# Patient Record
Sex: MALE | Race: WHITE | Employment: FULL TIME | URBAN - METROPOLITAN AREA
[De-identification: names, ages, dates, MRNs, and addresses within clinical notes are randomized per-mention and may not be internally consistent; named-entity substitution may affect disease eponyms.]

---

## 2022-06-28 RX ORDER — MONTELUKAST SODIUM 10 MG/1
TABLET ORAL
COMMUNITY
Start: 2022-04-04

## 2022-06-28 RX ORDER — BUDESONIDE 0.5 MG/2ML
INHALANT ORAL
COMMUNITY

## 2022-06-28 RX ORDER — ROSUVASTATIN CALCIUM 10 MG/1
TABLET, COATED ORAL
COMMUNITY
Start: 2021-12-09 | End: 2022-09-13 | Stop reason: SDUPTHER

## 2022-06-28 RX ORDER — AMOXICILLIN AND CLAVULANATE POTASSIUM 875; 125 MG/1; MG/1
TABLET, FILM COATED ORAL
COMMUNITY
End: 2022-09-13 | Stop reason: ALTCHOICE

## 2022-06-28 RX ORDER — AMLODIPINE BESYLATE 2.5 MG/1
TABLET ORAL
COMMUNITY
End: 2022-09-13 | Stop reason: ALTCHOICE

## 2022-09-01 PROBLEM — S51.019A: Status: ACTIVE | Noted: 2022-09-01

## 2022-09-01 PROBLEM — J30.9 ALLERGIC RHINITIS: Status: ACTIVE | Noted: 2022-09-01

## 2022-09-01 PROBLEM — R07.9 CHEST PAIN ON EXERTION: Status: ACTIVE | Noted: 2022-09-01

## 2022-09-01 PROBLEM — E78.2 MIXED HYPERLIPIDEMIA: Status: ACTIVE | Noted: 2022-09-01

## 2024-09-27 PROBLEM — R07.9 CHEST PAIN ON EXERTION: Status: RESOLVED | Noted: 2022-09-01 | Resolved: 2024-09-27

## 2024-09-27 PROBLEM — S51.019A: Status: RESOLVED | Noted: 2022-09-01 | Resolved: 2024-09-27

## 2024-12-17 PROBLEM — F41.9 ANXIETY: Status: ACTIVE | Noted: 2024-12-17

## 2025-01-15 ENCOUNTER — APPOINTMENT (OUTPATIENT)
Dept: CT IMAGING | Age: 45
End: 2025-01-15
Payer: COMMERCIAL

## 2025-01-15 ENCOUNTER — HOSPITAL ENCOUNTER (EMERGENCY)
Age: 45
Discharge: HOME | End: 2025-01-15
Payer: COMMERCIAL

## 2025-01-15 VITALS
OXYGEN SATURATION: 100 % | HEIGHT: 70 IN | TEMPERATURE: 98.4 F | DIASTOLIC BLOOD PRESSURE: 89 MMHG | WEIGHT: 195 LBS | RESPIRATION RATE: 14 BRPM | SYSTOLIC BLOOD PRESSURE: 147 MMHG | BODY MASS INDEX: 27.92 KG/M2 | HEART RATE: 52 BPM

## 2025-01-15 DIAGNOSIS — R20.2 PARESTHESIA: ICD-10-CM

## 2025-01-15 DIAGNOSIS — R55 VAGAL REACTION: Primary | ICD-10-CM

## 2025-01-15 LAB
ALBUMIN SERPL-MCNC: 4.3 G/DL (ref 3.5–5)
ALBUMIN/GLOB SERPL: 1.3 (ref 1–1.9)
ALP SERPL-CCNC: 52 U/L (ref 40–129)
ALT SERPL-CCNC: 40 U/L (ref 8–55)
ANION GAP SERPL CALC-SCNC: 11 MMOL/L (ref 7–16)
AST SERPL-CCNC: 30 U/L (ref 15–37)
BASOPHILS # BLD: 0.07 K/UL (ref 0–0.2)
BASOPHILS NFR BLD: 1.2 % (ref 0–2)
BILIRUB SERPL-MCNC: 0.5 MG/DL (ref 0–1.2)
BUN SERPL-MCNC: 17 MG/DL (ref 6–23)
CALCIUM SERPL-MCNC: 9.7 MG/DL (ref 8.8–10.2)
CHLORIDE SERPL-SCNC: 105 MMOL/L (ref 98–107)
CO2 SERPL-SCNC: 26 MMOL/L (ref 20–29)
CREAT SERPL-MCNC: 0.88 MG/DL (ref 0.8–1.3)
DIFFERENTIAL METHOD BLD: NORMAL
EKG ATRIAL RATE: 61 BPM
EKG DIAGNOSIS: NORMAL
EKG P AXIS: 75 DEGREES
EKG P-R INTERVAL: 132 MS
EKG Q-T INTERVAL: 434 MS
EKG QRS DURATION: 98 MS
EKG QTC CALCULATION (BAZETT): 436 MS
EKG R AXIS: 76 DEGREES
EKG T AXIS: 63 DEGREES
EKG VENTRICULAR RATE: 61 BPM
EOSINOPHIL # BLD: 0.1 K/UL (ref 0–0.8)
EOSINOPHIL NFR BLD: 1.7 % (ref 0.5–7.8)
ERYTHROCYTE [DISTWIDTH] IN BLOOD BY AUTOMATED COUNT: 12.2 % (ref 11.9–14.6)
GLOBULIN SER CALC-MCNC: 3.3 G/DL (ref 2.3–3.5)
GLUCOSE SERPL-MCNC: 100 MG/DL (ref 70–99)
HCT VFR BLD AUTO: 42.9 % (ref 41.1–50.3)
HGB BLD-MCNC: 14.5 G/DL (ref 13.6–17.2)
IMM GRANULOCYTES # BLD AUTO: 0.04 K/UL (ref 0–0.5)
IMM GRANULOCYTES NFR BLD AUTO: 0.7 % (ref 0–5)
LYMPHOCYTES # BLD: 1.56 K/UL (ref 0.5–4.6)
LYMPHOCYTES NFR BLD: 26.1 % (ref 13–44)
MCH RBC QN AUTO: 29.7 PG (ref 26.1–32.9)
MCHC RBC AUTO-ENTMCNC: 33.8 G/DL (ref 31.4–35)
MCV RBC AUTO: 87.9 FL (ref 82–102)
MONOCYTES # BLD: 0.63 K/UL (ref 0.1–1.3)
MONOCYTES NFR BLD: 10.5 % (ref 4–12)
NEUTS SEG # BLD: 3.58 K/UL (ref 1.7–8.2)
NEUTS SEG NFR BLD: 59.8 % (ref 43–78)
NRBC # BLD: 0 K/UL (ref 0–0.2)
PLATELET # BLD AUTO: 281 K/UL (ref 150–450)
PMV BLD AUTO: 10 FL (ref 9.4–12.3)
POTASSIUM SERPL-SCNC: 4.3 MMOL/L (ref 3.5–5.1)
PROT SERPL-MCNC: 7.6 G/DL (ref 6.3–8.2)
RBC # BLD AUTO: 4.88 M/UL (ref 4.23–5.6)
SODIUM SERPL-SCNC: 142 MMOL/L (ref 136–145)
TROPONIN T SERPL HS-MCNC: <6 NG/L (ref 0–22)
TROPONIN T SERPL HS-MCNC: <6 NG/L (ref 0–22)
WBC # BLD AUTO: 6 K/UL (ref 4.3–11.1)

## 2025-01-15 PROCEDURE — 85025 COMPLETE CBC W/AUTO DIFF WBC: CPT

## 2025-01-15 PROCEDURE — 93005 ELECTROCARDIOGRAM TRACING: CPT | Performed by: PHYSICIAN ASSISTANT

## 2025-01-15 PROCEDURE — 70450 CT HEAD/BRAIN W/O DYE: CPT

## 2025-01-15 PROCEDURE — 99284 EMERGENCY DEPT VISIT MOD MDM: CPT

## 2025-01-15 PROCEDURE — 93010 ELECTROCARDIOGRAM REPORT: CPT | Performed by: INTERNAL MEDICINE

## 2025-01-15 PROCEDURE — 80053 COMPREHEN METABOLIC PANEL: CPT

## 2025-01-15 PROCEDURE — 84484 ASSAY OF TROPONIN QUANT: CPT

## 2025-01-15 ASSESSMENT — PAIN SCALES - GENERAL: PAINLEVEL_OUTOF10: 0

## 2025-01-15 ASSESSMENT — PAIN - FUNCTIONAL ASSESSMENT
PAIN_FUNCTIONAL_ASSESSMENT: 0-10
PAIN_FUNCTIONAL_ASSESSMENT: 0-10

## 2025-01-15 ASSESSMENT — LIFESTYLE VARIABLES
HOW MANY STANDARD DRINKS CONTAINING ALCOHOL DO YOU HAVE ON A TYPICAL DAY: PATIENT DOES NOT DRINK
HOW OFTEN DO YOU HAVE A DRINK CONTAINING ALCOHOL: NEVER

## 2025-01-15 NOTE — ED TRIAGE NOTES
Ambulatory to triage.   Around 1030 this morning, patient was driving to work when suddenly started to have tunnel vision, tingling and burning sensation all over body. Felt as thought was going to pass out. Symptoms lasted around 30 sec

## 2025-01-15 NOTE — DISCHARGE INSTRUCTIONS
Decrease your Klonopin to 0.25 as needed instead of using the whole 0.5.  Push your fluids eat well-balanced diet.  See your at home cardiologist and primary care for recheck you may need referral to neurology for further evaluation of symptoms

## 2025-01-16 NOTE — ED PROVIDER NOTES
0.0 - 0.2 K/uL    Differential Type AUTOMATED      Neutrophils % 59.8 43.0 - 78.0 %    Lymphocytes % 26.1 13.0 - 44.0 %    Monocytes % 10.5 4.0 - 12.0 %    Eosinophils % 1.7 0.5 - 7.8 %    Basophils % 1.2 0.0 - 2.0 %    Immature Granulocytes % 0.7 0.0 - 5.0 %    Neutrophils Absolute 3.58 1.70 - 8.20 K/UL    Lymphocytes Absolute 1.56 0.50 - 4.60 K/UL    Monocytes Absolute 0.63 0.10 - 1.30 K/UL    Eosinophils Absolute 0.10 0.00 - 0.80 K/UL    Basophils Absolute 0.07 0.00 - 0.20 K/UL    Immature Granulocytes Absolute 0.04 0.0 - 0.5 K/UL   Troponin   Result Value Ref Range    Troponin T <6.0 0 - 22 ng/L   Troponin   Result Value Ref Range    Troponin T <6.0 0 - 22 ng/L   Comprehensive Metabolic Panel   Result Value Ref Range    Sodium 142 136 - 145 mmol/L    Potassium 4.3 3.5 - 5.1 mmol/L    Chloride 105 98 - 107 mmol/L    CO2 26 20 - 29 mmol/L    Anion Gap 11 7 - 16 mmol/L    Glucose 100 (H) 70 - 99 mg/dL    BUN 17 6 - 23 MG/DL    Creatinine 0.88 0.80 - 1.30 MG/DL    Est, Glom Filt Rate >90 >60 ml/min/1.73m2    Calcium 9.7 8.8 - 10.2 MG/DL    Total Bilirubin 0.5 0.0 - 1.2 MG/DL    ALT 40 8 - 55 U/L    AST 30 15 - 37 U/L    Alk Phosphatase 52 40 - 129 U/L    Total Protein 7.6 6.3 - 8.2 g/dL    Albumin 4.3 3.5 - 5.0 g/dL    Globulin 3.3 2.3 - 3.5 g/dL    Albumin/Globulin Ratio 1.3 1.0 - 1.9     EKG 12 Lead   Result Value Ref Range    Ventricular Rate 61 BPM    Atrial Rate 61 BPM    P-R Interval 132 ms    QRS Duration 98 ms    Q-T Interval 434 ms    QTc Calculation (Bazett) 436 ms    P Axis 75 degrees    R Axis 76 degrees    T Axis 63 degrees    Diagnosis       Normal sinus rhythm  with ECG artfiact  Normal ECG  No previous ECGs available  Confirmed by AVE BRAVO ()ROBERT ([id number]) on 1/15/2025 1:37:09 PM           CT HEAD WO CONTRAST   Final Result   Essentially negative plain CT scan of the head.      Electronically signed by Mayank Serrano         NIH Stroke Scale  Interval: Baseline  Level of Consciousness